# Patient Record
Sex: FEMALE | Race: WHITE | NOT HISPANIC OR LATINO
[De-identification: names, ages, dates, MRNs, and addresses within clinical notes are randomized per-mention and may not be internally consistent; named-entity substitution may affect disease eponyms.]

---

## 2022-03-09 PROBLEM — Z00.00 ENCOUNTER FOR PREVENTIVE HEALTH EXAMINATION: Status: ACTIVE | Noted: 2022-03-09

## 2022-03-10 ENCOUNTER — APPOINTMENT (OUTPATIENT)
Dept: PEDIATRIC ORTHOPEDIC SURGERY | Facility: CLINIC | Age: 87
End: 2022-03-10
Payer: MEDICARE

## 2022-03-10 VITALS — WEIGHT: 155 LBS | BODY MASS INDEX: 27.46 KG/M2 | HEIGHT: 63 IN

## 2022-03-10 DIAGNOSIS — Z86.79 PERSONAL HISTORY OF OTHER DISEASES OF THE CIRCULATORY SYSTEM: ICD-10-CM

## 2022-03-10 DIAGNOSIS — Z82.61 FAMILY HISTORY OF ARTHRITIS: ICD-10-CM

## 2022-03-10 PROCEDURE — 72040 X-RAY EXAM NECK SPINE 2-3 VW: CPT

## 2022-03-10 PROCEDURE — 99202 OFFICE O/P NEW SF 15 MIN: CPT | Mod: 25

## 2022-03-10 PROCEDURE — 20552 NJX 1/MLT TRIGGER POINT 1/2: CPT

## 2022-03-10 RX ORDER — CELECOXIB 200 MG/1
200 CAPSULE ORAL DAILY
Qty: 30 | Refills: 0 | Status: ACTIVE | COMMUNITY
Start: 2022-03-10 | End: 1900-01-01

## 2022-03-10 RX ORDER — HYDROCHLOROTHIAZIDE 12.5 MG/1
12.5 CAPSULE ORAL
Refills: 0 | Status: ACTIVE | COMMUNITY

## 2022-03-10 NOTE — HISTORY OF PRESENT ILLNESS
[de-identified] : This 89-year-old pleasant lady well-known to me is seen today for evaluation of her neck.  This patient over the past 6 months has had discomfort in her neck without any obvious precipitating event.  This she has tolerated well however over the past 4-6 weeks she has had increased pain and stiffness without radiation into either upper extremity.  No numbness or paresthesias motor weakness bladder or bowel dysfunction.  She did have x-rays of the cervical spine the beginning of February written results have been reviewed with multilevel degenerative disc space narrowing noted.  She has been placed on a Medrol Dosepak followed by Mobic with no improvement noted.  She has used tramadol on occasion for pain.  Her general health has been stable since last seen.

## 2022-03-10 NOTE — ASSESSMENT
[FreeTextEntry1] : Impression: Myalgias cervical spine.  Cervical radiculitis likely cervical spinal stenosis as well.\par \par I have injected the trigger point in the right trapezius.  Insofar as the Mobic has not been helpful she has been placed on a trial of Celebrex 200 mg daily PC.  Physical therapy has been ordered as well.  She will be in contact with me if she is not improving an MRI of the cervical spine and pain management may become necessary

## 2022-03-10 NOTE — PHYSICAL EXAM
[de-identified] : Her exam today reveals normal stance and gait.  Evaluation of the cervical spine reveals significant limitation of motion in all planes.  Extension tends to be most provocative of her pain and discomfort.  Rotation to the right is significantly limited as well and makes her quite symptomatic.  She does have obvious spasm on both sides of midline in the subaxial region there is a trigger point in the right trapezius.  Both upper extremities move well at all levels and she is neurologically intact without any obvious focal motor deficits.\par \par Once again I have reviewed written results of her cervical spine x-rays from February 1 of this year.  X-rays were taken today lateral views in maximal flexion and extension revealing no evidence of segmental instability.  There is marked multilevel degenerative disc space narrowing spur formation spondylosis and likely stenosis.

## 2022-03-10 NOTE — CONSULT LETTER
[Dear  ___] : Dear  [unfilled], [Consult Letter:] : I had the pleasure of evaluating your patient, [unfilled]. [Please see my note below.] : Please see my note below. [Consult Closing:] : Thank you very much for allowing me to participate in the care of this patient.  If you have any questions, please do not hesitate to contact me. [Sincerely,] : Sincerely, [FreeTextEntry3] : Dr Justice Celeste JR.\par

## 2022-03-10 NOTE — PROCEDURE
[FreeTextEntry1] : Under sterile technique with an alcohol prep the trigger point in the right trapezius was injected using a 22-gauge needle with 40 mg of Kenalog and 3 cc of 1% lidocaine with a Band-Aid dressing applied at the conclusion.  This was tolerated without incident

## 2022-04-21 ENCOUNTER — APPOINTMENT (OUTPATIENT)
Dept: PEDIATRIC ORTHOPEDIC SURGERY | Facility: CLINIC | Age: 87
End: 2022-04-21
Payer: MEDICARE

## 2022-04-21 VITALS — WEIGHT: 150 LBS | HEIGHT: 63 IN | TEMPERATURE: 96.7 F | BODY MASS INDEX: 26.58 KG/M2

## 2022-04-21 DIAGNOSIS — M54.12 RADICULOPATHY, CERVICAL REGION: ICD-10-CM

## 2022-04-21 DIAGNOSIS — M79.18 MYALGIA, OTHER SITE: ICD-10-CM

## 2022-04-21 PROCEDURE — 99212 OFFICE O/P EST SF 10 MIN: CPT

## 2022-04-21 NOTE — HISTORY OF PRESENT ILLNESS
[de-identified] : This patient continues to have significant neck pain radiating into the right shoulder girdle and upper extremity without improvement despite conservative measures to include Mobic and physical therapy.  She continues to be miserable.

## 2022-04-21 NOTE — ASSESSMENT
[FreeTextEntry1] : Impression: Cervical radiculitis rule out stenosis versus disc.\par \par Because of her continued complaints despite treatment MRI of the cervical spine has been ordered with the potential for epidural steroids to follow

## 2022-04-21 NOTE — PHYSICAL EXAM
[de-identified] : Her exam reveals no significant interval change painful restricted motion to the cervical spine with spasm and tenderness more so on the right side no trigger points on today's visit.  She remains neurologically stable

## 2022-10-24 ENCOUNTER — APPOINTMENT (OUTPATIENT)
Dept: PEDIATRIC ORTHOPEDIC SURGERY | Facility: CLINIC | Age: 87
End: 2022-10-24

## 2022-10-24 VITALS — WEIGHT: 150 LBS | HEIGHT: 63 IN | TEMPERATURE: 98.3 F | BODY MASS INDEX: 26.58 KG/M2

## 2022-10-24 PROCEDURE — 73560 X-RAY EXAM OF KNEE 1 OR 2: CPT

## 2022-10-24 PROCEDURE — 99213 OFFICE O/P EST LOW 20 MIN: CPT

## 2022-10-24 NOTE — ASSESSMENT
[FreeTextEntry1] : Impression: Status post left total knee replacement with pain.\par \par She will be treated symptomatically warm soaks continued use of the cane restricted activities along with Tylenol.  Return as necessary

## 2022-10-24 NOTE — PHYSICAL EXAM
[de-identified] : Her examination today reveals she is walking with a antalgic gait on the left side.  She has good motion to the left hip without irritability/pain.  The left knee has no effusion no soft tissue swelling with excellent motion noted and no evidence of instability on stress of the collateral ligaments.  She is stable with regards to anterior posterior stress.  No objective points of tenderness are noted to either the thigh shin.  The popliteal fossa calf neurovascular exam are stable.\par \par X-rays ordered and taken today of the left knee reveal total knee components unchanged in acceptable position with no evidence of subsidence lucency or fracture

## 2022-10-24 NOTE — HISTORY OF PRESENT ILLNESS
[de-identified] : This 89-year-old seen today with a 2-3-day history of insidious onset of pain to her left knee.  No obvious history of trauma precipitating event.  She has not had any pain in the low back or left hip region.  She does not recall once again any type of precipitating event.  She has not noted any swelling or sensation of instability.  Because of discomfort she has been using a cane in her right hand.  Prior to this she was functioning quite well

## 2022-11-11 ENCOUNTER — APPOINTMENT (OUTPATIENT)
Dept: PEDIATRIC ORTHOPEDIC SURGERY | Facility: CLINIC | Age: 87
End: 2022-11-11

## 2022-11-11 VITALS — TEMPERATURE: 97.4 F

## 2022-11-11 VITALS — HEIGHT: 63 IN | BODY MASS INDEX: 26.58 KG/M2 | WEIGHT: 150 LBS

## 2022-11-11 DIAGNOSIS — Z96.652 PAIN DUE TO INTERNAL ORTHOPEDIC PROSTHETIC DEVICES, IMPLANTS AND GRAFTS, INITIAL ENCOUNTER: ICD-10-CM

## 2022-11-11 DIAGNOSIS — T84.84XA PAIN DUE TO INTERNAL ORTHOPEDIC PROSTHETIC DEVICES, IMPLANTS AND GRAFTS, INITIAL ENCOUNTER: ICD-10-CM

## 2022-11-11 PROCEDURE — 99212 OFFICE O/P EST SF 10 MIN: CPT

## 2022-11-11 NOTE — ASSESSMENT
[FreeTextEntry1] : Impression: Nonspecific complaints of pain left knee status post total knee replacement.\par \par I have advised Tylenol warm soaks and continued use of her cane as needed follow-up

## 2022-11-11 NOTE — HISTORY OF PRESENT ILLNESS
[de-identified] : This 89-year-old returns she episodically will have discomfort to her left knee for no apparent reason.  She never has any swelling stiffness or sensation of instability.  No clicking or clunking.  She denies any back or hip pain.  She has not noted any swelling redness or increased warmth about the extremity.  She has not had any recent illness or fever

## 2022-11-11 NOTE — PHYSICAL EXAM
[de-identified] : Examination today she is walking well with a cane that will see any significant antalgic component.  She has good motion to the left hip.  No tenderness to the thigh.  The left knee has no effusion there is no soft tissue swelling no instability on stress her motion is excellent and there is no specific points of tenderness present.  Popliteal fossa calf neurovascular exam are negative.  The tibial segment as well as the ankle and foot are negative.  X-rays not felt to be necessary as they were performed just recently.

## 2023-03-26 ENCOUNTER — NON-APPOINTMENT (OUTPATIENT)
Age: 88
End: 2023-03-26